# Patient Record
Sex: FEMALE | Race: WHITE | NOT HISPANIC OR LATINO | Employment: FULL TIME | URBAN - METROPOLITAN AREA
[De-identification: names, ages, dates, MRNs, and addresses within clinical notes are randomized per-mention and may not be internally consistent; named-entity substitution may affect disease eponyms.]

---

## 2017-01-03 ENCOUNTER — TELEPHONE (OUTPATIENT)
Dept: ENDOCRINOLOGY | Age: 42
End: 2017-01-03

## 2017-01-03 NOTE — TELEPHONE ENCOUNTER
I spoke with patient today regarding her thyroid u/s letting her know that there were no nodules present at this time and that we would monitor it periodically

## 2017-09-06 RX ORDER — ERGOCALCIFEROL 1.25 MG/1
CAPSULE ORAL
Qty: 12 CAPSULE | Refills: 1 | OUTPATIENT
Start: 2017-09-06

## 2021-09-12 ENCOUNTER — HOSPITAL ENCOUNTER (EMERGENCY)
Facility: HOSPITAL | Age: 46
Discharge: HOME | End: 2021-09-12
Admitting: EMERGENCY MEDICINE

## 2021-09-12 VITALS
BODY MASS INDEX: 27.4 KG/M2 | OXYGEN SATURATION: 99 % | DIASTOLIC BLOOD PRESSURE: 102 MMHG | HEIGHT: 60 IN | RESPIRATION RATE: 20 BRPM | WEIGHT: 139.55 LBS | TEMPERATURE: 98.4 F | HEART RATE: 96 BPM | SYSTOLIC BLOOD PRESSURE: 151 MMHG

## 2021-09-12 DIAGNOSIS — J02.8 PHARYNGITIS DUE TO OTHER ORGANISM: Primary | ICD-10-CM

## 2021-09-12 LAB — S PYO AG THROAT QL: NEGATIVE

## 2021-09-12 PROCEDURE — 99283 EMERGENCY DEPT VISIT LOW MDM: CPT

## 2021-09-12 PROCEDURE — 87081 CULTURE SCREEN ONLY: CPT | Performed by: NURSE PRACTITIONER

## 2021-09-12 PROCEDURE — 87880 STREP A ASSAY W/OPTIC: CPT | Performed by: NURSE PRACTITIONER

## 2021-09-12 RX ORDER — ESOMEPRAZOLE MAGNESIUM 40 MG/1
40 CAPSULE, DELAYED RELEASE ORAL DAILY
COMMUNITY
Start: 2021-08-30 | End: 2021-09-12

## 2021-09-12 RX ORDER — FLUTICASONE PROPIONATE 50 MCG
1 SPRAY, SUSPENSION (ML) NASAL DAILY
COMMUNITY
Start: 2021-09-08 | End: 2021-11-09

## 2021-09-12 RX ORDER — AZITHROMYCIN 250 MG/1
TABLET, FILM COATED ORAL
Qty: 6 TABLET | Refills: 0 | Status: SHIPPED | OUTPATIENT
Start: 2021-09-12 | End: 2021-09-17

## 2021-09-12 RX ORDER — ALPRAZOLAM 0.5 MG/1
.25-.5 TABLET ORAL DAILY PRN
COMMUNITY
Start: 2021-04-19 | End: 2021-11-09

## 2021-09-12 RX ORDER — METFORMIN HYDROCHLORIDE 500 MG/1
1000 TABLET, EXTENDED RELEASE ORAL 2 TIMES DAILY
COMMUNITY
Start: 2021-03-22

## 2021-09-12 RX ORDER — HYDROXYZINE HYDROCHLORIDE 25 MG/1
25 TABLET, FILM COATED ORAL 2 TIMES DAILY
COMMUNITY
Start: 2021-06-16 | End: 2021-11-09

## 2021-09-12 RX ORDER — LISINOPRIL 5 MG/1
5 TABLET ORAL DAILY
COMMUNITY
Start: 2021-03-22 | End: 2021-09-12

## 2021-09-12 RX ORDER — RIZATRIPTAN BENZOATE 10 MG/1
10 TABLET ORAL
COMMUNITY
Start: 2021-06-30

## 2021-09-12 RX ORDER — SEMAGLUTIDE 1.34 MG/ML
INJECTION, SOLUTION SUBCUTANEOUS
COMMUNITY
Start: 2021-08-30

## 2021-09-12 RX ORDER — ZOLPIDEM TARTRATE 10 MG/1
10 TABLET ORAL NIGHTLY PRN
COMMUNITY
Start: 2021-07-16

## 2021-09-12 RX ORDER — CEFDINIR 300 MG/1
300 CAPSULE ORAL
COMMUNITY
Start: 2021-09-08 | End: 2021-09-17

## 2021-09-12 NOTE — ED PROVIDER NOTES
Subjective   Pt is 45 yo WF presenting to ED with complaint of left ear pain x 6 days. She states her face feels swollen. She states she was prescribed Omnicef 5 days ago from her PCP. She denies hearing loss, fevers, chills, cough. She does have some pain with swallowing.          Review of Systems   Constitutional: Negative for chills and fever.   HENT: Positive for ear pain and sore throat. Negative for congestion.    Eyes: Negative for pain.   Respiratory: Negative for cough, chest tightness and shortness of breath.    Cardiovascular: Negative for chest pain.   Gastrointestinal: Negative for abdominal pain, diarrhea, nausea and vomiting.   Genitourinary: Negative for flank pain and hematuria.   Musculoskeletal: Negative for joint swelling.   Skin: Negative for pallor.   Neurological: Negative for seizures and headaches.   All other systems reviewed and are negative.      Past Medical History:   Diagnosis Date   • Hyperlipidemia    • Hypertension    • Type 2 diabetes mellitus (CMS/HCC)    • Vitamin D deficiency        Allergies   Allergen Reactions   • Morphine    • Morphine And Related        History reviewed. No pertinent surgical history.    History reviewed. No pertinent family history.    Social History     Socioeconomic History   • Marital status:      Spouse name: Not on file   • Number of children: Not on file   • Years of education: Not on file   • Highest education level: Not on file   Tobacco Use   • Smoking status: Never Smoker   • Smokeless tobacco: Never Used   Substance and Sexual Activity   • Alcohol use: Defer   • Drug use: Defer   • Sexual activity: Defer           Objective   Physical Exam  Vitals and nursing note reviewed.   Constitutional:       Appearance: Normal appearance.   HENT:      Head: Normocephalic and atraumatic.      Right Ear: Tympanic membrane and ear canal normal.      Left Ear: Tympanic membrane and ear canal normal.      Nose: Nose normal.      Mouth/Throat:     Eyes:       Pupils: Pupils are equal, round, and reactive to light.   Pulmonary:      Effort: Pulmonary effort is normal.   Musculoskeletal:      Cervical back: Normal range of motion.   Skin:     General: Skin is warm and dry.   Neurological:      Mental Status: She is alert.   Psychiatric:         Mood and Affect: Mood normal.         Behavior: Behavior normal.         Procedures           ED Course      1233: Discussed ED findings with pt and plan for discharge. Pt verbalized understanding and agrees with plan.                                      MDM  Number of Diagnoses or Management Options  Pharyngitis due to other organism: established and worsening     Amount and/or Complexity of Data Reviewed  Clinical lab tests: reviewed    Risk of Complications, Morbidity, and/or Mortality  Presenting problems: low  Diagnostic procedures: low  Management options: low    Patient Progress  Patient progress: stable    Labs Reviewed   RAPID STREP A SCREEN - Normal   BETA HEMOLYTIC STREP CULTURE, THROAT       Final diagnoses:   Pharyngitis due to other organism       ED Disposition  ED Disposition     ED Disposition Condition Comment    Discharge Stable           Jesús Holden      As needed    [facility] EAR, NOSE & THROAT  [address]  [address] [id number] [phone]  Call            Medication List      New Prescriptions    azithromycin 250 MG tablet  Commonly known as: Zithromax Z-Eugenio  Take 2 tablets by mouth on day 1, then 1 tablet daily on days 2-5           Where to Get Your Medications      These medications were sent to [facility]/pharmacy #[id number] - [place] - [address] - [phone]  - [phone] FX  [address] [place] [address]    Hours: 24-hours Phone: [phone]   · azithromycin 250 MG tablet          Pavithra Winter APRN  09/12/21 [id number]

## 2021-09-14 LAB — BACTERIA SPEC AEROBE CULT: NORMAL

## 2021-09-16 ENCOUNTER — OFFICE VISIT (OUTPATIENT)
Dept: OTOLARYNGOLOGY | Facility: CLINIC | Age: 46
End: 2021-09-16

## 2021-09-16 VITALS — WEIGHT: 141 LBS | HEIGHT: 60 IN | TEMPERATURE: 97.4 F | BODY MASS INDEX: 27.68 KG/M2

## 2021-09-16 DIAGNOSIS — R07.0 THROAT PAIN: Primary | ICD-10-CM

## 2021-09-16 DIAGNOSIS — J35.8 TONSILLAR MASS: ICD-10-CM

## 2021-09-16 DIAGNOSIS — J35.3 ENLARGED TONSILS AND ADENOIDS: ICD-10-CM

## 2021-09-16 DIAGNOSIS — J03.90 TONSILLITIS: ICD-10-CM

## 2021-09-16 PROCEDURE — 99204 OFFICE O/P NEW MOD 45 MIN: CPT | Performed by: OTOLARYNGOLOGY

## 2021-09-16 RX ORDER — AMOXICILLIN AND CLAVULANATE POTASSIUM 875; 125 MG/1; MG/1
TABLET, FILM COATED ORAL
COMMUNITY
Start: 2021-08-24 | End: 2021-09-17

## 2021-09-16 RX ORDER — GABAPENTIN 800 MG/1
TABLET ORAL
COMMUNITY
Start: 2021-06-22 | End: 2021-09-17

## 2021-09-16 RX ORDER — PROCHLORPERAZINE 25 MG/1
SUPPOSITORY RECTAL
COMMUNITY
Start: 2021-08-09

## 2021-09-16 RX ORDER — VILAZODONE HYDROCHLORIDE 40 MG/1
40 TABLET ORAL DAILY
COMMUNITY
Start: 2021-08-31

## 2021-09-16 RX ORDER — ICOSAPENT ETHYL 1000 MG/1
2 CAPSULE ORAL 2 TIMES DAILY
COMMUNITY
Start: 2021-09-13 | End: 2022-08-27 | Stop reason: SDUPTHER

## 2021-09-16 RX ORDER — AZITHROMYCIN 500 MG/1
TABLET, FILM COATED ORAL
COMMUNITY
Start: 2021-09-12 | End: 2021-09-17

## 2021-09-16 NOTE — H&P (VIEW-ONLY)
Patient Name: Alejandra Delacruz   Visit Date: 09/16/2021   Patient ID: [id number]  Provider: Beau Andres MD    Sex: female  [place] [facility] [facility] [place]   YOB: 1975  Location Address: [address] [place] [address], [address],?[place]?[address]    Primary Care Provider Kieran Holdenua Brayan  Location Phone: [phone]    Referring Provider: KARLY Braden        Chief Complaint  Other (Pharyngitis, ear pain)    Subjective    History of Present Illness  Alejandra Delacruz is a 46 y.o. female who presents to [facility]AT today as a consult from KARLY Braden.    She presents the clinic today for evaluation of 1 month history of bilateral ear pain worse on the left, as well as throat pain.  She has been on multiple courses of antibiotics for this, 4 different antibiotics in the last 6 weeks, without significant improvement.  She finished her last course of antibiotics yesterday.  She continues to have left-sided throat pain.  She has never been a heavy smoker or drinker.  Pain radiates to the left ear.  She has not had any changes to her hearing or ear history.  Her weight has been stable, and she has had no other symptoms.  No previous history of tonsillitis or strep throat infections.    Past Medical History:   Diagnosis Date   • Depression    • Hyperlipidemia    • Hypertension    • Sleep apnea    • Type 2 diabetes mellitus (CMS/HCC)    • Vitamin D deficiency        Past Surgical History:   Procedure Laterality Date   • BLADDER SUSPENSION     • D & C AND LAPAROSCOPY     • EYE SURGERY     • LASER ABLATION     • SHOULDER SURGERY           Current Outpatient Medications:   •  Aspirin Buf,CaCarb-MgCarb-MgO, 81 MG tablet, Take 81 mg by mouth Daily., Disp: , Rfl:   •  atorvastatin (LIPITOR) 40 MG tablet, Take 40 mg by mouth Daily., Disp: , Rfl:   •  Continuous Blood Gluc Sensor (Dexcom G6 Sensor), Apply 1 each topically to the appropriate area as directed.,  Disp: , Rfl:   •  Continuous Blood Gluc Transmit (Dexcom G6 Transmitter) misc, , Disp: , Rfl:   •  empagliflozin (JARDIANCE) 25 MG tablet tablet, Take 25 mg by mouth Daily., Disp: , Rfl:   •  ergocalciferol (DRISDOL) 97419 UNITS capsule, Take 1 po q week, Disp: 12 capsule, Rfl: 1  •  fluticasone (FLONASE) 50 MCG/ACT nasal spray, 1 spray into the nostril(s) as directed by provider Daily., Disp: , Rfl:   •  gabapentin (NEURONTIN) 800 MG tablet, , Disp: , Rfl:   •  glucagon (GLUCAGON EMERGENCY) 1 MG injection, Inject 1 mg under the skin 1 (One) Time As Needed for low blood sugar for up to 1 dose., Disp: 1 kit, Rfl: 0  •  hydrOXYzine (ATARAX) 25 MG tablet, Take 25 mg by mouth., Disp: , Rfl:   •  ibuprofen (ADVIL,MOTRIN) 800 MG tablet, Take 800 mg by mouth Every 8 (Eight) Hours As Needed for mild pain (1-3)., Disp: , Rfl:   •  lisinopril (PRINIVIL,ZESTRIL) 20 MG tablet, Take 20 mg by mouth Daily., Disp: , Rfl:   •  metFORMIN ER (GLUCOPHAGE-XR) 500 MG 24 hr tablet, Take 1,000 mg by mouth., Disp: , Rfl:   •  omeprazole (priLOSEC) 40 MG capsule, Take 40 mg by mouth Daily., Disp: , Rfl:   •  PARoxetine (PAXIL) 40 MG tablet, Take 40 mg by mouth Every Morning., Disp: , Rfl:   •  rizatriptan (MAXALT) 10 MG tablet, Take 10 mg by mouth., Disp: , Rfl:   •  Semaglutide,0.25 or 0.5MG/DOS, (Ozempic, 0.25 or 0.5 MG/DOSE,) 2 MG/1.5ML solution pen-injector, INJECT 0.5MG SUBCUTANEOUSLY EVERY 7 DAYS, Disp: , Rfl:   •  traZODone (DESYREL) 150 MG tablet, Take 150 mg by mouth Every Night., Disp: , Rfl:   •  Vascepa 1 g capsule capsule, , Disp: , Rfl:   •  Viibryd 40 MG tablet tablet, , Disp: , Rfl:   •  zolpidem (AMBIEN) 10 MG tablet, Take 10 mg by mouth., Disp: , Rfl:   •  ALPRAZolam (XANAX) 0.5 MG tablet, Take 0.25-0.5 mg by mouth., Disp: , Rfl:   •  amoxicillin-clavulanate (AUGMENTIN) 875-125 MG per tablet, , Disp: , Rfl:   •  azithromycin (Zithromax Z-Eugenio) 250 MG tablet, Take 2 tablets by mouth on day 1, then 1 tablet daily on days 2-5,  "Disp: 6 tablet, Rfl: 0  •  azithromycin (ZITHROMAX) 500 MG tablet, , Disp: , Rfl:   •  cefdinir (OMNICEF) 300 MG capsule, Take 300 mg by mouth., Disp: , Rfl:   •  cyclobenzaprine (FLEXERIL) 10 MG tablet, Take 10 mg by mouth 3 (Three) Times a Day As Needed for muscle spasms., Disp: , Rfl:   •  Dapagliflozin Propanediol 10 MG tablet, Take 10 mg by mouth Daily., Disp: , Rfl:   •  gabapentin (NEURONTIN) 600 MG tablet, Take 600 mg by mouth 3 (Three) Times a Day., Disp: , Rfl:   •  omega-3 acid ethyl esters (LOVAZA) 1 G capsule, Take 2 g by mouth 2 (Two) Times a Day., Disp: , Rfl:      Allergies   Allergen Reactions   • Morphine And Related Unknown - Low Severity       Family History   Problem Relation Age of Onset   • Diabetes Mother    • Heart disease Mother    • Heart disease Sister    • Diabetes Paternal Aunt    • Cancer Maternal Grandmother    • Heart disease Paternal Grandfather    • Cancer Paternal Grandfather         Social History     Social History Narrative   • Not on file       Objective     Vital Signs:   Temp 97.4 °F (36.3 °C) (Temporal)   Ht 152.4 cm (60\")   Wt 64 kg (141 lb)   BMI 27.54 kg/m²       Physical Exam         Constitutional   Appearance  · : well developed, well-nourished, alert and in no acute distress, voice clear and strong    Head  Inspection  · : no deformities or lesions  Face  Inspection  · : No facial lesions; House-Brackmann I/VI bilaterally  Palpation  · : No TMJ crepitus nor  muscle tenderness bilaterally    Eyes  Vision  Visual Fields  · : Extraocular movements are intact. No spontaneous or gaze-induced nystagmus.  Conjunctivae  · : clear  Sclerae  · : clear  Pupils and Irises  · : pupils equal, round, and reactive to light.     Ears, Nose, Mouth and Throat    Ears    External Ears  · : appearance within normal limits, no lesions present  Otoscopic Examination  · : Tympanic membrane appearance within normal limits bilaterally without perforations, well-aerated middle " ears  Hearing  · : intact to conversational voice both ears  Tunning fork testing:     :    Nose    External Nose  · : appearance normal  Intranasal Exam  · : mucosa within normal limits, vestibules normal, no intranasal lesions present, septum midline, sinuses non tender to percussion  Oral Cavity    Oral Mucosa  · : oral mucosa normal without pallor or cyanosis  Lips  · : lip appearance normal  Teeth  · : normal dentition for age  Gums  · : gums pink, non-swollen, no bleeding present  Tongue  · : tongue appearance normal; normal mobility  Palate  · : hard palate normal, soft palate appearance normal with symmetric mobility    Throat    Oropharynx  · : no inflammation or lesions present, tonsils asymmetric, left tonsil is larger than the right, bilateral chronic infected appearing, left tonsil is slightly hard on palpation, possible tonsil mass  Hypopharynx  · : appearance within normal limits, superior epiglottis within normal limits  Larynx  · : appearance within normal limits, vocal cords within normal limits, no lesions present    Neck  Inspection/Palpation  · : normal appearance, no masses or tenderness, trachea midline; thyroid size normal, nontender, no nodules or masses present on palpation    Respiratory  Respiratory Effort  · : breathing unlabored  Inspection of Chest  · : normal appearance, no retractions    Cardiovascular  Heart  · : regular rate and rhythm    Lymphatic  Neck  · : no lymphadenopathy present  Supraclavicular Nodes  · : no lymphadenopathy present  Preauricular Nodes  · : no lymphadenopathy present    Skin and Subcutaneous Tissue  General Inspection  · : Regarding face and neck - there are no rashes present, no lesions present, and no areas of discoloration    Neurologic  Cranial Nerves  · : cranial nerves II-XII are grossly intact bilaterally  Gait and Station  · : normal gait, able to stand without diffculty    Psychiatric  Judgement and Insight  · : judgment and insight intact  Mood and  Affect  · : mood normal, affect appropriate          Assessment and Plan    Diagnoses and all orders for this visit:    1. Throat pain (Primary)  -     Case Request; Standing  -     Case Request    2. Enlarged tonsils and adenoids  -     Case Request; Standing  -     Case Request    3. Tonsillitis  -     Case Request; Standing  -     Case Request    4. Tonsillar mass  -     Case Request; Standing  -     Case Request    Other orders  -     Follow Anesthesia Guidelines / Protocol; Future  -     Obtain Informed Consent; Future  -     Follow Anesthesia Guidelines / Protocol; Standing  -     Verify NPO Status; Standing  -     Obtain Informed Consent; Standing  -     NPO Diet; Standing    Examination today revealed the tonsils to be symmetric and bilaterally chronically infected appearing.  The left tonsil appears hard on palpation and may have an intratonsillar mass.  I discussed the findings with her and because of the clinical history of having no previous tonsil or strep throat issues and now not improving and multiple course of antibiotics, I would recommend tonsillectomy and adenoidectomy, as well as a direct laryngoscopy and bronchoscopy to rule out malignancy.  I discussed the procedure with her at length, including possible complications and alternatives.  She states that she understands, would like to proceed.  I will make arrangements to have the scheduled for her soon as possible.    Follow Up   No follow-ups on file.  Patient was given instructions and counseling regarding her condition or for health maintenance advice. Please see specific information pulled into the AVS if appropriate.

## 2021-09-16 NOTE — PROGRESS NOTES
Patient Name: Alejandra Delacruz   Visit Date: 09/16/2021   Patient ID: [id number]  Provider: Beau Andres MD    Sex: female  [place]: [facility]   YOB: 1975  Location Address: [address] [place] [address], [address],?[place]    Primary Care Provider Kieran Holdenua Brayan  Location Phone: [phone]    Referring Provider: KARLY Braden        Chief Complaint  Other (Pharyngitis, ear pain)    Subjective    History of Present Illness  Alejandra Delacruz is a 46 y.o. female who presents to [facility] E[facility], NOSE [facility] THROAT today as a consult from KARLY Braden.    She presents the clinic today for evaluation of 1 month history of bilateral ear pain worse on the left, as well as throat pain.  She has been on multiple courses of antibiotics for this, 4 different antibiotics in the last 6 weeks, without significant improvement.  She finished her last course of antibiotics yesterday.  She continues to have left-sided throat pain.  She has never been a heavy smoker or drinker.  Pain radiates to the left ear.  She has not had any changes to her hearing or ear history.  Her weight has been stable, and she has had no other symptoms.  No previous history of tonsillitis or strep throat infections.    Past Medical History:   Diagnosis Date   • Depression    • Hyperlipidemia    • Hypertension    • Sleep apnea    • Type 2 diabetes mellitus (CMS/HCC)    • Vitamin D deficiency        Past Surgical History:   Procedure Laterality Date   • BLADDER SUSPENSION     • D & C AND LAPAROSCOPY     • EYE SURGERY     • LASER ABLATION     • SHOULDER SURGERY           Current Outpatient Medications:   •  Aspirin Buf,CaCarb-MgCarb-MgO, 81 MG tablet, Take 81 mg by mouth Daily., Disp: , Rfl:   •  atorvastatin (LIPITOR) 40 MG tablet, Take 40 mg by mouth Daily., Disp: , Rfl:   •  Continuous Blood Gluc Sensor (Dexcom G6 Sensor), Apply 1 each topically to the appropriate area as directed.,  Disp: , Rfl:   •  Continuous Blood Gluc Transmit (Dexcom G6 Transmitter) misc, , Disp: , Rfl:   •  empagliflozin (JARDIANCE) 25 MG tablet tablet, Take 25 mg by mouth Daily., Disp: , Rfl:   •  ergocalciferol (DRISDOL) 68124 UNITS capsule, Take 1 po q week, Disp: 12 capsule, Rfl: 1  •  fluticasone (FLONASE) 50 MCG/ACT nasal spray, 1 spray into the nostril(s) as directed by provider Daily., Disp: , Rfl:   •  gabapentin (NEURONTIN) 800 MG tablet, , Disp: , Rfl:   •  glucagon (GLUCAGON EMERGENCY) 1 MG injection, Inject 1 mg under the skin 1 (One) Time As Needed for low blood sugar for up to 1 dose., Disp: 1 kit, Rfl: 0  •  hydrOXYzine (ATARAX) 25 MG tablet, Take 25 mg by mouth., Disp: , Rfl:   •  ibuprofen (ADVIL,MOTRIN) 800 MG tablet, Take 800 mg by mouth Every 8 (Eight) Hours As Needed for mild pain (1-3)., Disp: , Rfl:   •  lisinopril (PRINIVIL,ZESTRIL) 20 MG tablet, Take 20 mg by mouth Daily., Disp: , Rfl:   •  metFORMIN ER (GLUCOPHAGE-XR) 500 MG 24 hr tablet, Take 1,000 mg by mouth., Disp: , Rfl:   •  omeprazole (priLOSEC) 40 MG capsule, Take 40 mg by mouth Daily., Disp: , Rfl:   •  PARoxetine (PAXIL) 40 MG tablet, Take 40 mg by mouth Every Morning., Disp: , Rfl:   •  rizatriptan (MAXALT) 10 MG tablet, Take 10 mg by mouth., Disp: , Rfl:   •  Semaglutide,0.25 or 0.5MG/DOS, (Ozempic, 0.25 or 0.5 MG/DOSE,) 2 MG/1.5ML solution pen-injector, INJECT 0.5MG SUBCUTANEOUSLY EVERY 7 DAYS, Disp: , Rfl:   •  traZODone (DESYREL) 150 MG tablet, Take 150 mg by mouth Every Night., Disp: , Rfl:   •  Vascepa 1 g capsule capsule, , Disp: , Rfl:   •  Viibryd 40 MG tablet tablet, , Disp: , Rfl:   •  zolpidem (AMBIEN) 10 MG tablet, Take 10 mg by mouth., Disp: , Rfl:   •  ALPRAZolam (XANAX) 0.5 MG tablet, Take 0.25-0.5 mg by mouth., Disp: , Rfl:   •  amoxicillin-clavulanate (AUGMENTIN) 875-125 MG per tablet, , Disp: , Rfl:   •  azithromycin (Zithromax Z-Eugenio) 250 MG tablet, Take 2 tablets by mouth on day 1, then 1 tablet daily on days 2-5,  "Disp: 6 tablet, Rfl: 0  •  azithromycin (ZITHROMAX) 500 MG tablet, , Disp: , Rfl:   •  cefdinir (OMNICEF) 300 MG capsule, Take 300 mg by mouth., Disp: , Rfl:   •  cyclobenzaprine (FLEXERIL) 10 MG tablet, Take 10 mg by mouth 3 (Three) Times a Day As Needed for muscle spasms., Disp: , Rfl:   •  Dapagliflozin Propanediol 10 MG tablet, Take 10 mg by mouth Daily., Disp: , Rfl:   •  gabapentin (NEURONTIN) 600 MG tablet, Take 600 mg by mouth 3 (Three) Times a Day., Disp: , Rfl:   •  omega-3 acid ethyl esters (LOVAZA) 1 G capsule, Take 2 g by mouth 2 (Two) Times a Day., Disp: , Rfl:      Allergies   Allergen Reactions   • Morphine And Related Unknown - Low Severity       Family History   Problem Relation Age of Onset   • Diabetes Mother    • Heart disease Mother    • Heart disease Sister    • Diabetes Paternal Aunt    • Cancer Maternal Grandmother    • Heart disease Paternal Grandfather    • Cancer Paternal Grandfather         Social History     Social History Narrative   • Not on file       Objective     Vital Signs:   Temp 97.4 °F (36.3 °C) (Temporal)   Ht 152.4 cm (60\")   Wt 64 kg (141 lb)   BMI 27.54 kg/m²       Physical Exam         Constitutional   Appearance  · : well developed, well-nourished, alert and in no acute distress, voice clear and strong    Head  Inspection  · : no deformities or lesions  Face  Inspection  · : No facial lesions; House-Brackmann I/VI bilaterally  Palpation  · : No TMJ crepitus nor  muscle tenderness bilaterally    Eyes  Vision  Visual Fields  · : Extraocular movements are intact. No spontaneous or gaze-induced nystagmus.  Conjunctivae  · : clear  Sclerae  · : clear  Pupils and Irises  · : pupils equal, round, and reactive to light.     Ears, Nose, Mouth and Throat    Ears    External Ears  · : appearance within normal limits, no lesions present  Otoscopic Examination  · : Tympanic membrane appearance within normal limits bilaterally without perforations, well-aerated middle " ears  Hearing  · : intact to conversational voice both ears  Tunning fork testing:     :    Nose    External Nose  · : appearance normal  Intranasal Exam  · : mucosa within normal limits, vestibules normal, no intranasal lesions present, septum midline, sinuses non tender to percussion  Oral Cavity    Oral Mucosa  · : oral mucosa normal without pallor or cyanosis  Lips  · : lip appearance normal  Teeth  · : normal dentition for age  Gums  · : gums pink, non-swollen, no bleeding present  Tongue  · : tongue appearance normal; normal mobility  Palate  · : hard palate normal, soft palate appearance normal with symmetric mobility    Throat    Oropharynx  · : no inflammation or lesions present, tonsils asymmetric, left tonsil is larger than the right, bilateral chronic infected appearing, left tonsil is slightly hard on palpation, possible tonsil mass  Hypopharynx  · : appearance within normal limits, superior epiglottis within normal limits  Larynx  · : appearance within normal limits, vocal cords within normal limits, no lesions present    Neck  Inspection/Palpation  · : normal appearance, no masses or tenderness, trachea midline; thyroid size normal, nontender, no nodules or masses present on palpation    Respiratory  Respiratory Effort  · : breathing unlabored  Inspection of Chest  · : normal appearance, no retractions    Cardiovascular  Heart  · : regular rate and rhythm    Lymphatic  Neck  · : no lymphadenopathy present  Supraclavicular Nodes  · : no lymphadenopathy present  Preauricular Nodes  · : no lymphadenopathy present    Skin and Subcutaneous Tissue  General Inspection  · : Regarding face and neck - there are no rashes present, no lesions present, and no areas of discoloration    Neurologic  Cranial Nerves  · : cranial nerves II-XII are grossly intact bilaterally  Gait and Station  · : normal gait, able to stand without diffculty    Psychiatric  Judgement and Insight  · : judgment and insight intact  Mood and  Affect  · : mood normal, affect appropriate          Assessment and Plan    Diagnoses and all orders for this visit:    1. Throat pain (Primary)  -     Case Request; Standing  -     Case Request    2. Enlarged tonsils and adenoids  -     Case Request; Standing  -     Case Request    3. Tonsillitis  -     Case Request; Standing  -     Case Request    4. Tonsillar mass  -     Case Request; Standing  -     Case Request    Other orders  -     Follow Anesthesia Guidelines / Protocol; Future  -     Obtain Informed Consent; Future  -     Follow Anesthesia Guidelines / Protocol; Standing  -     Verify NPO Status; Standing  -     Obtain Informed Consent; Standing  -     NPO Diet; Standing    Examination today revealed the tonsils to be symmetric and bilaterally chronically infected appearing.  The left tonsil appears hard on palpation and may have an intratonsillar mass.  I discussed the findings with her and because of the clinical history of having no previous tonsil or strep throat issues and now not improving and multiple course of antibiotics, I would recommend tonsillectomy and adenoidectomy, as well as a direct laryngoscopy and bronchoscopy to rule out malignancy.  I discussed the procedure with her at length, including possible complications and alternatives.  She states that she understands, would like to proceed.  I will make arrangements to have the scheduled for her soon as possible.    Follow Up   No follow-ups on file.  Patient was given instructions and counseling regarding her condition or for health maintenance advice. Please see specific information pulled into the AVS if appropriate.

## 2021-09-17 NOTE — PRE-PROCEDURE INSTRUCTIONS
Patient instructed to have no food past midnight, clears up to 2 hours prior to arrival time.  Patient instructed to take gabapentin, viibryd, hydroxyzine, omeprazole, and prn meds if needed. Instructed to stop NSAIDS. Patient verbalized understanding.

## 2021-09-20 ENCOUNTER — HOSPITAL ENCOUNTER (OUTPATIENT)
Facility: HOSPITAL | Age: 46
Setting detail: HOSPITAL OUTPATIENT SURGERY
Discharge: HOME | End: 2021-09-20
Attending: OTOLARYNGOLOGY | Admitting: OTOLARYNGOLOGY

## 2021-09-20 ENCOUNTER — ANESTHESIA (OUTPATIENT)
Dept: PERIOP | Facility: HOSPITAL | Age: 46
End: 2021-09-20

## 2021-09-20 ENCOUNTER — ANESTHESIA EVENT (OUTPATIENT)
Dept: PERIOP | Facility: HOSPITAL | Age: 46
End: 2021-09-20

## 2021-09-20 VITALS
TEMPERATURE: 98 F | OXYGEN SATURATION: 95 % | BODY MASS INDEX: 26.97 KG/M2 | HEIGHT: 60 IN | WEIGHT: 137.35 LBS | RESPIRATION RATE: 19 BRPM | SYSTOLIC BLOOD PRESSURE: 131 MMHG | HEART RATE: 89 BPM | DIASTOLIC BLOOD PRESSURE: 71 MMHG

## 2021-09-20 DIAGNOSIS — J03.90 TONSILLITIS: ICD-10-CM

## 2021-09-20 DIAGNOSIS — J35.3 ENLARGED TONSILS AND ADENOIDS: ICD-10-CM

## 2021-09-20 DIAGNOSIS — J35.8 TONSILLAR MASS: ICD-10-CM

## 2021-09-20 DIAGNOSIS — R07.0 THROAT PAIN: ICD-10-CM

## 2021-09-20 LAB
B-HCG UR QL: NEGATIVE
GLUCOSE BLDC GLUCOMTR-MCNC: 105 MG/DL (ref 70–99)
GLUCOSE BLDC GLUCOMTR-MCNC: 135 MG/DL (ref 70–99)

## 2021-09-20 PROCEDURE — 93005 ELECTROCARDIOGRAM TRACING: CPT | Performed by: ANESTHESIOLOGY

## 2021-09-20 PROCEDURE — 82962 GLUCOSE BLOOD TEST: CPT

## 2021-09-20 PROCEDURE — 25010000002 PROCHLORPERAZINE 10 MG/2ML SOLUTION: Performed by: ANESTHESIOLOGY

## 2021-09-20 PROCEDURE — 25010000002 MIDAZOLAM PER 1MG: Performed by: ANESTHESIOLOGY

## 2021-09-20 PROCEDURE — 25010000002 NEOSTIGMINE 10 MG/10ML SOLUTION: Performed by: ANESTHESIOLOGY

## 2021-09-20 PROCEDURE — 25010000002 ONDANSETRON PER 1 MG: Performed by: ANESTHESIOLOGY

## 2021-09-20 PROCEDURE — 25010000002 HYDROMORPHONE PER 4 MG: Performed by: ANESTHESIOLOGY

## 2021-09-20 PROCEDURE — 88304 TISSUE EXAM BY PATHOLOGIST: CPT | Performed by: OTOLARYNGOLOGY

## 2021-09-20 PROCEDURE — 25010000002 DEXAMETHASONE PER 1 MG: Performed by: ANESTHESIOLOGY

## 2021-09-20 PROCEDURE — 93010 ELECTROCARDIOGRAM REPORT: CPT | Performed by: INTERNAL MEDICINE

## 2021-09-20 PROCEDURE — 25010000002 PROPOFOL 10 MG/ML EMULSION: Performed by: ANESTHESIOLOGY

## 2021-09-20 PROCEDURE — 25010000002 FENTANYL CITRATE (PF) 50 MCG/ML SOLUTION: Performed by: ANESTHESIOLOGY

## 2021-09-20 PROCEDURE — 81025 URINE PREGNANCY TEST: CPT | Performed by: ANESTHESIOLOGY

## 2021-09-20 RX ORDER — ONDANSETRON 2 MG/ML
4 INJECTION INTRAMUSCULAR; INTRAVENOUS ONCE AS NEEDED
Status: DISCONTINUED | OUTPATIENT
Start: 2021-09-20 | End: 2021-09-20 | Stop reason: HOSPADM

## 2021-09-20 RX ORDER — OXYCODONE HYDROCHLORIDE 5 MG/1
5 TABLET ORAL
Status: DISCONTINUED | OUTPATIENT
Start: 2021-09-20 | End: 2021-09-20 | Stop reason: HOSPADM

## 2021-09-20 RX ORDER — ACETAMINOPHEN 500 MG
1000 TABLET ORAL ONCE
Status: COMPLETED | OUTPATIENT
Start: 2021-09-20 | End: 2021-09-20

## 2021-09-20 RX ORDER — SODIUM CHLORIDE, SODIUM LACTATE, POTASSIUM CHLORIDE, CALCIUM CHLORIDE 600; 310; 30; 20 MG/100ML; MG/100ML; MG/100ML; MG/100ML
9 INJECTION, SOLUTION INTRAVENOUS CONTINUOUS PRN
Status: DISCONTINUED | OUTPATIENT
Start: 2021-09-20 | End: 2021-09-20 | Stop reason: HOSPADM

## 2021-09-20 RX ORDER — HYDROCODONE BITARTRATE AND ACETAMINOPHEN 5; 325 MG/1; MG/1
1-2 TABLET ORAL EVERY 4 HOURS PRN
Qty: 30 TABLET | Refills: 0 | Status: SHIPPED | OUTPATIENT
Start: 2021-09-20 | End: 2021-11-09

## 2021-09-20 RX ORDER — PROMETHAZINE HYDROCHLORIDE 25 MG/1
25 SUPPOSITORY RECTAL ONCE AS NEEDED
Status: DISCONTINUED | OUTPATIENT
Start: 2021-09-20 | End: 2021-09-20 | Stop reason: HOSPADM

## 2021-09-20 RX ORDER — NEOSTIGMINE METHYLSULFATE 1 MG/ML
INJECTION, SOLUTION INTRAVENOUS AS NEEDED
Status: DISCONTINUED | OUTPATIENT
Start: 2021-09-20 | End: 2021-09-20 | Stop reason: SURG

## 2021-09-20 RX ORDER — ONDANSETRON 2 MG/ML
INJECTION INTRAMUSCULAR; INTRAVENOUS
Status: DISCONTINUED
Start: 2021-09-20 | End: 2021-09-20 | Stop reason: HOSPADM

## 2021-09-20 RX ORDER — MIDAZOLAM HYDROCHLORIDE 2 MG/2ML
2 INJECTION, SOLUTION INTRAMUSCULAR; INTRAVENOUS ONCE
Status: COMPLETED | OUTPATIENT
Start: 2021-09-20 | End: 2021-09-20

## 2021-09-20 RX ORDER — MAGNESIUM HYDROXIDE 1200 MG/15ML
LIQUID ORAL AS NEEDED
Status: DISCONTINUED | OUTPATIENT
Start: 2021-09-20 | End: 2021-09-20 | Stop reason: HOSPADM

## 2021-09-20 RX ORDER — DEXAMETHASONE SODIUM PHOSPHATE 4 MG/ML
INJECTION, SOLUTION INTRA-ARTICULAR; INTRALESIONAL; INTRAMUSCULAR; INTRAVENOUS; SOFT TISSUE AS NEEDED
Status: DISCONTINUED | OUTPATIENT
Start: 2021-09-20 | End: 2021-09-20 | Stop reason: SURG

## 2021-09-20 RX ORDER — PROPOFOL 10 MG/ML
VIAL (ML) INTRAVENOUS AS NEEDED
Status: DISCONTINUED | OUTPATIENT
Start: 2021-09-20 | End: 2021-09-20 | Stop reason: SURG

## 2021-09-20 RX ORDER — MEPERIDINE HYDROCHLORIDE 25 MG/ML
12.5 INJECTION INTRAMUSCULAR; INTRAVENOUS; SUBCUTANEOUS
Status: DISCONTINUED | OUTPATIENT
Start: 2021-09-20 | End: 2021-09-20 | Stop reason: HOSPADM

## 2021-09-20 RX ORDER — HYDROMORPHONE HCL 110MG/55ML
PATIENT CONTROLLED ANALGESIA SYRINGE INTRAVENOUS AS NEEDED
Status: DISCONTINUED | OUTPATIENT
Start: 2021-09-20 | End: 2021-09-20 | Stop reason: SURG

## 2021-09-20 RX ORDER — GLYCOPYRROLATE 0.2 MG/ML
0.2 INJECTION INTRAMUSCULAR; INTRAVENOUS
Status: COMPLETED | OUTPATIENT
Start: 2021-09-20 | End: 2021-09-20

## 2021-09-20 RX ORDER — PROMETHAZINE HYDROCHLORIDE 12.5 MG/1
25 TABLET ORAL ONCE AS NEEDED
Status: DISCONTINUED | OUTPATIENT
Start: 2021-09-20 | End: 2021-09-20 | Stop reason: HOSPADM

## 2021-09-20 RX ORDER — GLYCOPYRROLATE 0.2 MG/ML
INJECTION INTRAMUSCULAR; INTRAVENOUS AS NEEDED
Status: DISCONTINUED | OUTPATIENT
Start: 2021-09-20 | End: 2021-09-20 | Stop reason: SURG

## 2021-09-20 RX ORDER — PROCHLORPERAZINE EDISYLATE 5 MG/ML
5 INJECTION INTRAMUSCULAR; INTRAVENOUS ONCE AS NEEDED
Status: COMPLETED | OUTPATIENT
Start: 2021-09-20 | End: 2021-09-20

## 2021-09-20 RX ORDER — FENTANYL CITRATE 50 UG/ML
INJECTION, SOLUTION INTRAMUSCULAR; INTRAVENOUS AS NEEDED
Status: DISCONTINUED | OUTPATIENT
Start: 2021-09-20 | End: 2021-09-20 | Stop reason: SURG

## 2021-09-20 RX ORDER — ONDANSETRON 2 MG/ML
INJECTION INTRAMUSCULAR; INTRAVENOUS AS NEEDED
Status: DISCONTINUED | OUTPATIENT
Start: 2021-09-20 | End: 2021-09-20 | Stop reason: SURG

## 2021-09-20 RX ORDER — PROCHLORPERAZINE EDISYLATE 5 MG/ML
5 INJECTION INTRAMUSCULAR; INTRAVENOUS ONCE
Status: DISCONTINUED | OUTPATIENT
Start: 2021-09-20 | End: 2021-09-20

## 2021-09-20 RX ORDER — ROCURONIUM BROMIDE 10 MG/ML
INJECTION, SOLUTION INTRAVENOUS AS NEEDED
Status: DISCONTINUED | OUTPATIENT
Start: 2021-09-20 | End: 2021-09-20 | Stop reason: SURG

## 2021-09-20 RX ORDER — PHENYLEPHRINE HCL IN 0.9% NACL 1 MG/10 ML
SYRINGE (ML) INTRAVENOUS AS NEEDED
Status: DISCONTINUED | OUTPATIENT
Start: 2021-09-20 | End: 2021-09-20 | Stop reason: SURG

## 2021-09-20 RX ADMIN — ACETAMINOPHEN 1000 MG: 500 TABLET ORAL at 09:00

## 2021-09-20 RX ADMIN — HYDROMORPHONE HYDROCHLORIDE 1 MG: 2 INJECTION, SOLUTION INTRAMUSCULAR; INTRAVENOUS; SUBCUTANEOUS at 10:40

## 2021-09-20 RX ADMIN — GLYCOPYRROLATE 0.2 MG: 0.2 INJECTION INTRAMUSCULAR; INTRAVENOUS at 09:41

## 2021-09-20 RX ADMIN — ONDANSETRON 4 MG: 2 INJECTION INTRAMUSCULAR; INTRAVENOUS at 10:25

## 2021-09-20 RX ADMIN — HYDROMORPHONE HYDROCHLORIDE 1 MG: 2 INJECTION, SOLUTION INTRAMUSCULAR; INTRAVENOUS; SUBCUTANEOUS at 11:05

## 2021-09-20 RX ADMIN — GLYCOPYRROLATE 0.4 MCG: 0.2 INJECTION INTRAMUSCULAR; INTRAVENOUS at 11:10

## 2021-09-20 RX ADMIN — Medication 200 MCG: at 10:40

## 2021-09-20 RX ADMIN — ROCURONIUM BROMIDE 40 MG: 10 INJECTION INTRAVENOUS at 10:21

## 2021-09-20 RX ADMIN — MIDAZOLAM HYDROCHLORIDE 2 MG: 1 INJECTION, SOLUTION INTRAMUSCULAR; INTRAVENOUS at 09:41

## 2021-09-20 RX ADMIN — PROPOFOL 200 MG: 10 INJECTION, EMULSION INTRAVENOUS at 10:20

## 2021-09-20 RX ADMIN — FENTANYL CITRATE 100 MCG: 50 INJECTION INTRAMUSCULAR; INTRAVENOUS at 10:22

## 2021-09-20 RX ADMIN — ONDANSETRON 4 MG: 2 INJECTION INTRAMUSCULAR; INTRAVENOUS at 11:32

## 2021-09-20 RX ADMIN — Medication 200 MCG: at 10:22

## 2021-09-20 RX ADMIN — DEXAMETHASONE SODIUM PHOSPHATE 4 MG: 4 INJECTION INTRA-ARTICULAR; INTRALESIONAL; INTRAMUSCULAR; INTRAVENOUS; SOFT TISSUE at 10:25

## 2021-09-20 RX ADMIN — NEOSTIGMINE METHYLSULFATE 4 MG: 1 INJECTION, SOLUTION INTRAVENOUS at 11:10

## 2021-09-20 RX ADMIN — DEXAMETHASONE SODIUM PHOSPHATE 4 MG: 4 INJECTION INTRA-ARTICULAR; INTRALESIONAL; INTRAMUSCULAR; INTRAVENOUS; SOFT TISSUE at 10:47

## 2021-09-20 RX ADMIN — SODIUM CHLORIDE, POTASSIUM CHLORIDE, SODIUM LACTATE AND CALCIUM CHLORIDE 9 ML/HR: 600; 310; 30; 20 INJECTION, SOLUTION INTRAVENOUS at 09:41

## 2021-09-20 RX ADMIN — PROCHLORPERAZINE EDISYLATE 5 MG: 5 INJECTION INTRAMUSCULAR; INTRAVENOUS at 12:23

## 2021-09-20 NOTE — ANESTHESIA POSTPROCEDURE EVALUATION
Patient: Alejandra Delacruz    Procedure Summary     Date: 09/20/21 Room / Location: [facility] OR 05 / [facility] MAIN OR    Anesthesia Start: 1015 Anesthesia Stop: 1117    Procedure: TONSILLECTOMY AND ADENOIDECTOMY, DIRECT LARYNGOSCOPY,BRONCHOSCOPY,ESOPHAGOSCOPY (N/A Throat) Diagnosis:       Throat pain      Enlarged tonsils and adenoids      Tonsillitis      Tonsillar mass      (Throat pain [R07.0])      (Enlarged tonsils and adenoids [J35.3])      (Tonsillitis [J03.90])      (Tonsillar mass [J35.8])    Surgeons: Beau Andres MD Provider: Silver Price MD    Anesthesia Type: general ASA Status: 3          Anesthesia Type: general    Vitals  Vitals Value Taken Time   /66 09/20/21 1144   Temp 36.2 °C (97.1 °F) 09/20/21 1121   Pulse 71 09/20/21 1147   Resp 18 09/20/21 1136   SpO2 96 % 09/20/21 1147   Vitals shown include unvalidated device data.        Post Anesthesia Care and Evaluation    Patient location during evaluation: bedside  Patient participation: complete - patient participated  Level of consciousness: awake  Pain score: 0  Pain management: adequate  Airway patency: patent  Anesthetic complications: No anesthetic complications  PONV Status: none  Cardiovascular status: acceptable and stable  Respiratory status: acceptable and room air  Hydration status: acceptable    Comments: An Anesthesiologist personally participated in the most demanding procedures (including induction and emergence if applicable) in the anesthesia plan, monitored the course of anesthesia administration at frequent intervals and remained physically present and available for immediate diagnosis and treatment of emergencies.

## 2021-09-20 NOTE — BRIEF OP NOTE
TONSILLECTOMY AND ADENOIDECTOMY  Progress Note    Alejandra Delacruz  9/20/2021    Pre-op Diagnosis:   Throat pain [R07.0]  Enlarged tonsils and adenoids [J35.3]  Tonsillitis [J03.90]  Tonsillar mass [J35.8]       Post-Op Diagnosis Codes:     * Throat pain [R07.0]     * Enlarged tonsils and adenoids [J35.3]     * Tonsillitis [J03.90]     * Tonsillar mass [J35.8]    Procedure/CPT® Codes:  96130  36829  15852  13231     Procedure(s):  TONSILLECTOMY AND ADENOIDECTOMY, DIRECT LARYNGOSCOPY,BRONCHOSCOPY,ESOPHAGOSCOPY     Surgeon(s):  Beau Andres MD     Anesthesia: General     Staff:   Circulator: Tanya Myers RN  Scrub Person: Isai Chapin  Other: Marcela Marquez RN CSA     Estimated Blood Loss: 5 mL     Urine Voided: * No values recorded between 9/20/2021 10:14 AM and 9/20/2021 11:17 AM *     Specimens:                           Specimens      ID Source Type Tests Collected By Collected At Frozen?     A Tonsil, Left Tissue TISSUE PATHOLOGY EXAM    Beau Andres MD 9/20/21 1043 No     Description: LEFT TONSIL     B Tonsil, Right Tissue TISSUE PATHOLOGY EXAM    Beau Andres MD 9/20/21 1043 No     Description: RIGHT TONSIL             Findings:                           1. Normal Laryngoscopy without lesions or abnormality                          2. Normal Bronchoscopy                           3. Left tonsil indurated, hard on palpation, surrounding soft tissue edema and scar tissue                          4. Right tonsil normal                          5. Adenoid mildly enlarged, chronic infected appearance     Complications: None     The patient was brought into the operating room and placed in the supine position on the operating room table. Mask inhalational anesthesia was induced, and the patient was intubated orotracheally without difficulty. Next, a timeout was performed to identify the correct patient and procedure. The head of the bed was then turned 90°. The [facility]  laryngoscope was introduced into the oral cavity, and passed down to the level of the larynx. The epiglottis appeared completely normal as did the base of tongue.  The vocal folds were clean and free of any lesions. Photodocumentation of the findings was obtained.  The left tonsil appeared more prominent, was hard on palpation, and had significant edema of surrounding tissues. Photos were obtained.     Next, the bronchoscope was passed through the vocal folds into the subglottic area.  The subglottic area was normal without any lesions or masses, and was not narrowed.  The endotracheal tube was taken down and the trachea was assessed.  The tracheal rings were intact, and there was no evidence of obstruction or stricture.  The blanca was normal.  There were no abnormalities noted on bronchoscopy.  Photos of this were taken.     Next, the bronchoscope and laryngoscope were withdrawn.  The cervical esophagoscope was introduced into the esophageal introitus, and passed easily down the level of the cervical esophagus.  Suction was used to get rid of secretions, and the mucosa was thoroughly assess.  The [employer] scopes were used to assess the mucosa of the scope was withdrawn, and pictures were taken.  There were no abnormal findings noted on esophagoscopy.      The Ray-Dandre mouth retractor is introduced into the oral cavity, and was suspended on a Bowling stand. There was no evidence of a submucosal cleft or bifid uvula. The tonsils were 1+ on the right and 2+ on the left, hypertrophic, and chronically infected-appearing. The tonsil tenaculum was used to grasp the right tonsil, and the Bovie cautery was used to dissect out the tonsil from the superior anterior pole down to the posterior inferior pole at the level of the capsule. The same procedure was then performed on the left side. Left tonsil was more indurated and had surrounding scar tissue between tonsil and surrounding musculature. Peritonsillar plane was less  well defined. Hemostasis was achieved with the Bipolar cautery.      Attention was then turned to the adenoid. The red rubber catheters placed through the right nasal passage and the soft palate was elevated anteriorly. A mirror was used to visualize the adenoid pad which was mildly enlarged, and chronically infected-appearing. The suction Bovie was used to take down the adenoid pad and achieve hemostasis within the nasopharynx. Saline was used to irrigate the nasopharynx, and hemostasis was confirmed. This concluded the case, the patient's care was handed back to anesthesia in good condition without any complications.        Beau Andres MD     Date: 9/20/2021  Time: 11:51 EDT

## 2021-09-20 NOTE — ANESTHESIA PREPROCEDURE EVALUATION
Anesthesia Evaluation     Patient summary reviewed and Nursing notes reviewed   no history of anesthetic complications:  NPO Solid Status: > 8 hours  NPO Liquid Status: > 2 hours           Airway   Mallampati: II  TM distance: >3 FB  Neck ROM: full  No difficulty expected  Dental - normal exam     Pulmonary - normal exam   (+) sleep apnea on CPAP,   Cardiovascular - normal exam  Exercise tolerance: good (4-7 METS)    (+) hypertension, hyperlipidemia,       Neuro/Psych  (+) psychiatric history PTSD and Depression,     GI/Hepatic/Renal/Endo    (+)   diabetes mellitus type 2,     Musculoskeletal     (+) neck pain (throat pain),   Abdominal  - normal exam    Bowel sounds: normal.   Substance History - negative use     OB/GYN negative ob/gyn ROS         Other - negative ROS       ROS/Med Hx Other: Morphine itching, Dilaudid was ok                Anesthesia Plan    ASA 3     general     intravenous induction     Anesthetic plan, all risks, benefits, and alternatives have been provided, discussed and informed consent has been obtained with: patient.

## 2021-09-21 LAB
CYTO UR: NORMAL
LAB AP CASE REPORT: NORMAL
LAB AP CLINICAL INFORMATION: NORMAL
PATH REPORT.FINAL DX SPEC: NORMAL
PATH REPORT.GROSS SPEC: NORMAL
QT INTERVAL: 375 MS

## 2021-09-23 NOTE — BRIEF OP NOTE
TONSILLECTOMY AND ADENOIDECTOMY  Progress Note    Alejandra Delacruz  9/20/2021    Pre-op Diagnosis:   Throat pain [R07.0]  Enlarged tonsils and adenoids [J35.3]  Tonsillitis [J03.90]  Tonsillar mass [J35.8]       Post-Op Diagnosis Codes:     * Throat pain [R07.0]     * Enlarged tonsils and adenoids [J35.3]     * Tonsillitis [J03.90]     * Tonsillar mass [J35.8]    Procedure/CPT® Codes:    22758  29946  27320  23464    Procedure(s):  TONSILLECTOMY AND ADENOIDECTOMY, DIRECT LARYNGOSCOPY,BRONCHOSCOPY,ESOPHAGOSCOPY    Surgeon(s):  Beau Andres MD    Anesthesia: General    Staff:   Circulator: Tanya Myers RN  Scrub Person: Isai Chapin  Other: Marcela Marquez RN CSA     Estimated Blood Loss: 5 mL    Urine Voided: * No values recorded between 9/20/2021 10:14 AM and 9/20/2021 11:17 AM *    Specimens:                Specimens     ID Source Type Tests Collected By Collected At Frozen?    A Tonsil, Left Tissue · TISSUE PATHOLOGY EXAM   Beau Andres MD 9/20/21 1043 No    Description: LEFT TONSIL    B Tonsil, Right Tissue · TISSUE PATHOLOGY EXAM   Beau Andres MD 9/20/21 1043 No    Description: RIGHT TONSIL          Findings:     1. Normal Laryngoscopy without lesions or abnormality    2. Normal Bronchoscopy     3. Left tonsil indurated, hard on palpation, surrounding soft tissue edema and scar tissue    4. Right tonsil normal    5. Adenoid mildly enlarged, chronic infected appearance    Complications: None    The patient was brought into the operating room and placed in the supine position on the operating room table. Mask inhalational anesthesia was induced, and the patient was intubated orotracheally without difficulty. Next, a timeout was performed to identify the correct patient and procedure. The head of the bed was then turned 90°. The [employer] laryngoscope was introduced into the oral cavity, and passed down to the level of the larynx. The epiglottis appeared completely normal as did  the base of tongue.  The vocal folds were clean and free of any lesions. Photodocumentation of the findings was obtained.  The left tonsil appeared more prominent, was hard on palpation, and had significant edema of surrounding tissues. Photos were obtained.    Next, the bronchoscope was passed through the vocal folds into the subglottic area.  The subglottic area was normal without any lesions or masses, and was not narrowed.  The endotracheal tube was taken down and the trachea was assessed.  The tracheal rings were intact, and there was no evidence of obstruction or stricture.  The blanca was normal.  There were no abnormalities noted on bronchoscopy.  Photos of this were taken.    Next, the bronchoscope and laryngoscope were withdrawn.  The cervical esophagoscope was introduced into the esophageal introitus, and passed easily down the level of the cervical esophagus.  Suction was used to get rid of secretions, and the mucosa was thoroughly assess.  The [employer] scopes were used to assess the mucosa of the scope was withdrawn, and pictures were taken.  There were no abnormal findings noted on esophagoscopy.     The Ray-Dandre mouth retractor is introduced into the oral cavity, and was suspended on a Bowling stand. There was no evidence of a submucosal cleft or bifid uvula. The tonsils were 1+ on the right and 2+ on the left, hypertrophic, and chronically infected-appearing. The tonsil tenaculum was used to grasp the right tonsil, and the Bovie cautery was used to dissect out the tonsil from the superior anterior pole down to the posterior inferior pole at the level of the capsule. The same procedure was then performed on the left side. Left tonsil was more indurated and had surrounding scar tissue between tonsil and surrounding musculature. Peritonsillar plane was less well defined. Hemostasis was achieved with the Bipolar cautery.     Attention was then turned to the adenoid. The red rubber catheters placed through  the right nasal passage and the soft palate was elevated anteriorly. A mirror was used to visualize the adenoid pad which was mildly enlarged, and chronically infected-appearing. The suction Bovie was used to take down the adenoid pad and achieve hemostasis within the nasopharynx. Saline was used to irrigate the nasopharynx, and hemostasis was confirmed. This concluded the case, the patient's care was handed back to anesthesia in good condition without any complications.      Beau Andres MD     Date: 9/23/2021  Time: 10:14 EDT

## 2021-09-27 ENCOUNTER — OFFICE VISIT (OUTPATIENT)
Dept: OTOLARYNGOLOGY | Facility: CLINIC | Age: 46
End: 2021-09-27

## 2021-09-27 VITALS — WEIGHT: 135.6 LBS | BODY MASS INDEX: 26.62 KG/M2 | HEIGHT: 60 IN | TEMPERATURE: 97.6 F

## 2021-09-27 DIAGNOSIS — J03.90 TONSILLITIS: Primary | ICD-10-CM

## 2021-09-27 DIAGNOSIS — R07.0 THROAT PAIN: ICD-10-CM

## 2021-09-27 PROCEDURE — 99213 OFFICE O/P EST LOW 20 MIN: CPT | Performed by: OTOLARYNGOLOGY

## 2021-09-27 NOTE — PROGRESS NOTES
Patient Name: Alejandra Delacruz   Visit Date: 2021   Patient ID: [id number]  Provider: Beau Andres MD    Sex: female  Location: [facility]   YOB: 1975  Location Address: [address] [place] [address], [address] [place] [address],?[place]?[id number]    Primary Care Provider Jesús Holden  Location Phone: [phone]    Referring Provider: No ref. provider found        Chief Complaint  No chief complaint on file.    Subjective    History of Present Illness  Alejandra Delacruz is a 46 y.o. female who presents to [facility] today as a consult from No ref. provider found.    She presents the clinic today for follow-up regarding throat pain, and recently underwent tonsillectomy and adenoidectomy as well as direct laryngoscopy.  She informs me that she has been doing very well, has not had any issues postoperatively.  She still has some mild discomfort and has 2 more pills left of her Norco.  We discussed transitioning to Tylenol.     Pathology report revealed focal acute inflammation with actinomyces colonies bilaterally but no evidence of malignancy on either side.    Intraoperatively there is significant scar tissue around the left tonsil, likely from infection since pathology was clear.    Past Medical History:   Diagnosis Date   • Arthritis    • Depression    • Dysphagia    • Enlarged tonsils    • GERD (gastroesophageal reflux disease)    • Hyperlipidemia    • Hypertension    • Sleep apnea     dose not use cpap    • Type 2 diabetes mellitus (CMS/[facility])     blood glucose around 150-200 , last A1c  7 per pt    • Vitamin D deficiency        Past Surgical History:   Procedure Laterality Date   • ADENOIDECTOMY     • BLADDER SUSPENSION     •  SECTION     • D & C AND LAPAROSCOPY     • EYE SURGERY      x4    • LASER ABLATION     • SHOULDER SURGERY      bilat SLAP tear   • TONSILLECTOMY     • TONSILLECTOMY AND ADENOIDECTOMY N/A 2021    Procedure:  TONSILLECTOMY AND ADENOIDECTOMY, DIRECT LARYNGOSCOPY,BRONCHOSCOPY,ESOPHAGOSCOPY;  Surgeon: Beau Andres MD;  Location: [facility] MAIN OR;  Service: ENT;  Laterality: N/A;         Current Outpatient Medications:   •  ALPRAZolam (XANAX) 0.5 MG tablet, Take 0.25-0.5 mg by mouth Daily As Needed., Disp: , Rfl:   •  atorvastatin (LIPITOR) 40 MG tablet, Take 40 mg by mouth Every Night., Disp: , Rfl:   •  Continuous Blood Gluc Sensor (Dexcom G6 Sensor), Apply 1 each topically to the appropriate area as directed., Disp: , Rfl:   •  Continuous Blood Gluc Transmit (Dexcom G6 Transmitter) misc, , Disp: , Rfl:   •  cyclobenzaprine (FLEXERIL) 10 MG tablet, Take 10 mg by mouth 3 (Three) Times a Day As Needed for muscle spasms., Disp: , Rfl:   •  empagliflozin (JARDIANCE) 25 MG tablet tablet, Take 25 mg by mouth Daily., Disp: , Rfl:   •  ergocalciferol (DRISDOL) 81204 UNITS capsule, Take 1 po q week (Patient taking differently: Take 50,000 Units by mouth. Takes twice a week), Disp: 12 capsule, Rfl: 1  •  fluticasone (FLONASE) 50 MCG/ACT nasal spray, 1 spray into the nostril(s) as directed by provider Daily., Disp: , Rfl:   •  gabapentin (NEURONTIN) 800 MG tablet, Take 800 mg by mouth 2 (Two) Times a Day., Disp: , Rfl:   •  glucagon (GLUCAGON EMERGENCY) 1 MG injection, Inject 1 mg under the skin 1 (One) Time As Needed for low blood sugar for up to 1 dose., Disp: 1 kit, Rfl: 0  •  HYDROcodone-acetaminophen (NORCO) 5-325 MG per tablet, Take 1-2 tablets by mouth Every 4 (Four) Hours As Needed for Moderate Pain ., Disp: 30 tablet, Rfl: 0  •  hydrOXYzine (ATARAX) 25 MG tablet, Take 25 mg by mouth 2 (two) times a day., Disp: , Rfl:   •  ibuprofen (ADVIL,MOTRIN) 800 MG tablet, Take 800 mg by mouth Every 8 (Eight) Hours As Needed for mild pain (1-3)., Disp: , Rfl:   •  lisinopril (PRINIVIL,ZESTRIL) 10 MG tablet, Take 10 mg by mouth Daily., Disp: , Rfl:   •  metFORMIN ER (GLUCOPHAGE-XR) 500 MG 24 hr tablet, Take 1,000 mg by mouth 2 (two)  "times a day., Disp: , Rfl:   •  omeprazole (priLOSEC) 40 MG capsule, Take 40 mg by mouth Daily., Disp: , Rfl:   •  rizatriptan (MAXALT) 10 MG tablet, Take 10 mg by mouth., Disp: , Rfl:   •  Semaglutide,0.25 or 0.5MG/DOS, (Ozempic, 0.25 or 0.5 MG/DOSE,) 2 MG/1.5ML solution pen-injector, INJECT 0.5MG SUBCUTANEOUSLY EVERY 7 DAYS, Disp: , Rfl:   •  traZODone (DESYREL) 100 MG tablet, Take 100 mg by mouth Every Night., Disp: , Rfl:   •  Vascepa 1 g capsule capsule, Take 2 g by mouth 2 (two) times a day., Disp: , Rfl:   •  Viibryd 40 MG tablet tablet, Take 40 mg by mouth Daily., Disp: , Rfl:   •  zolpidem (AMBIEN) 10 MG tablet, Take 10 mg by mouth At Night As Needed., Disp: , Rfl:      Allergies   Allergen Reactions   • Morphine And Related Itching       Family History   Problem Relation Age of Onset   • Diabetes Mother    • Heart disease Mother    • Heart disease Sister    • Diabetes Paternal Aunt    • Cancer Maternal Grandmother    • Heart disease Paternal Grandfather    • Cancer Paternal Grandfather         Social History     Social History Narrative   • Not on file       Objective     Vital Signs:   Temp 97.6 °F (36.4 °C) (Temporal)   Ht 152.4 cm (60\")   Wt 61.5 kg (135 lb 9.6 oz)   BMI 26.48 kg/m²       Physical Exam         Constitutional   Appearance  · : well developed, well-nourished, alert and in no acute distress, voice clear and strong    Head  Inspection  · : no deformities or lesions  Face  Inspection  · : No facial lesions; House-Brackmann I/VI bilaterally  Palpation  · : No TMJ crepitus nor  muscle tenderness bilaterally    Eyes  Vision  Visual Fields  · : Extraocular movements are intact. No spontaneous or gaze-induced nystagmus.  Conjunctivae  · : clear  Sclerae  · : clear  Pupils and Irises  · : pupils equal, round, and reactive to light.     Ears, Nose, Mouth and Throat    Ears    External Ears  · : appearance within normal limits, no lesions present  Otoscopic Examination  · : Tympanic " membrane appearance within normal limits bilaterally without perforations, well-aerated middle ears  Hearing  · : intact to conversational voice both ears  Tunning fork testing:     :    Nose    External Nose  · : appearance normal  Intranasal Exam  · : mucosa within normal limits, vestibules normal, no intranasal lesions present, septum midline, sinuses non tender to percussion  Oral Cavity    Oral Mucosa  · : oral mucosa normal without pallor or cyanosis  Lips  · : lip appearance normal  Teeth  · : normal dentition for age  Gums  · : gums pink, non-swollen, no bleeding present  Tongue  · : tongue appearance normal; normal mobility  Palate  · : hard palate normal, soft palate appearance normal with symmetric mobility    Throat    Oropharynx  · : no inflammation or lesions present, tonsils surgically absent, well-healing tonsillar fossa, granulation tissue present bilaterally  Hypopharynx  · : appearance within normal limits, superior epiglottis within normal limits  Larynx  · : appearance within normal limits, vocal cords within normal limits, no lesions present    Neck  Inspection/Palpation  · : normal appearance, no masses or tenderness, trachea midline; thyroid size normal, nontender, no nodules or masses present on palpation    Respiratory  Respiratory Effort  · : breathing unlabored  Inspection of Chest  · : normal appearance, no retractions    Cardiovascular  Heart  · : regular rate and rhythm    Lymphatic  Neck  · : no lymphadenopathy present  Supraclavicular Nodes  · : no lymphadenopathy present  Preauricular Nodes  · : no lymphadenopathy present    Skin and Subcutaneous Tissue  General Inspection  · : Regarding face and neck - there are no rashes present, no lesions present, and no areas of discoloration    Neurologic  Cranial Nerves  · : cranial nerves II-XII are grossly intact bilaterally  Gait and Station  · : normal gait, able to stand without diffculty    Psychiatric  Judgement and Insight  · :  judgment and insight intact  Mood and Affect  · : mood normal, affect appropriate          Assessment and Plan    Diagnoses and all orders for this visit:    1. Tonsillitis (Primary)    2. Throat pain    Exam reveals well-healing tonsillar fossa and pathology was negative for malignancy.  I discussed physical restrictions for another week and would like to see her back in 6 weeks to reassess her throat, or sooner should there be any issues.    Follow Up   No follow-ups on file.  Patient was given instructions and counseling regarding her condition or for health maintenance advice. Please see specific information pulled into the AVS if appropriate.

## 2021-11-09 ENCOUNTER — OFFICE VISIT (OUTPATIENT)
Dept: OTOLARYNGOLOGY | Facility: CLINIC | Age: 46
End: 2021-11-09

## 2021-11-09 VITALS — BODY MASS INDEX: 27.56 KG/M2 | WEIGHT: 140.4 LBS | HEIGHT: 60 IN | TEMPERATURE: 97.8 F

## 2021-11-09 DIAGNOSIS — J03.90 TONSILLITIS: Primary | ICD-10-CM

## 2021-11-09 DIAGNOSIS — J35.8 TONSILLAR MASS: ICD-10-CM

## 2021-11-09 PROBLEM — J35.3 ENLARGED TONSILS AND ADENOIDS: Status: RESOLVED | Noted: 2021-09-16 | Resolved: 2021-11-09

## 2021-11-09 PROBLEM — R07.0 THROAT PAIN: Status: RESOLVED | Noted: 2021-09-16 | Resolved: 2021-11-09

## 2021-11-09 PROCEDURE — 99024 POSTOP FOLLOW-UP VISIT: CPT | Performed by: OTOLARYNGOLOGY

## 2021-11-09 RX ORDER — GABAPENTIN 800 MG/1
800 TABLET ORAL 3 TIMES DAILY
COMMUNITY
Start: 2021-10-25 | End: 2022-10-25

## 2021-11-09 RX ORDER — DICLOFENAC SODIUM 75 MG/1
75 TABLET, DELAYED RELEASE ORAL
COMMUNITY
Start: 2021-11-04 | End: 2022-11-04

## 2021-11-09 RX ORDER — HYDROXYZINE HYDROCHLORIDE 25 MG/1
25 TABLET, FILM COATED ORAL
COMMUNITY
Start: 2021-10-25

## 2021-11-09 RX ORDER — ONDANSETRON HYDROCHLORIDE 8 MG/1
TABLET, FILM COATED ORAL
COMMUNITY
Start: 2021-11-05

## 2021-11-09 NOTE — PROGRESS NOTES
Patient Name: Alejandar Delacruz   Visit Date: 2021   Patient ID: [id number]  Provider: Beau Andres MD    Sex: female  Location: [facility]   YOB: 1975  Location Address: [address] [place] [address], [address] [place] [address],?[place]?[id number]    Primary Care Provider Jesús Holden  Location Phone: [phone]    Referring Provider: No ref. provider found        Chief Complaint  Follow-up (6 week follow up) and Sore Throat    Subjective    History of Present Illness  Alejandra Delacruz is a 46 y.o. female who presents to [facility] today as a consult from No ref. provider found.    She presents the clinic today for evaluation of Subjective:  Throat pain and history of chronic tonsillitis and associated discomfort.  She underwent tonsillectomy and adenoidectomy for this, and has recovered very well.  She notes that the pain is completely subsided and denies any complaints whatsoever.  She had an uneventful recovery.    Objective:  Oral cavity shows tonsillar fossa are well-healed with no evidence of inflammation.    Pathology report reveals focal acute inflammation and actinomyces colonies bilaterally, negative for malignancy.    Assessment:  Chronic tonsillitis, resolved    Plan:  I will have her contact me should there be any further issues.    Beau Andres MD  2021   10:53 EST .    Past Medical History:   Diagnosis Date   • Arthritis    • Depression    • Dysphagia    • Enlarged tonsils    • GERD (gastroesophageal reflux disease)    • Hyperlipidemia    • Hypertension    • Sleep apnea     dose not use cpap    • Tonsillitis    • Type 2 diabetes mellitus (HCC)     blood glucose around 150-200 , last A1c  7 per pt    • Vitamin D deficiency        Past Surgical History:   Procedure Laterality Date   • ADENOIDECTOMY     • BLADDER SUSPENSION     •  SECTION     • D & C AND LAPAROSCOPY     • EYE SURGERY      x4    • LASER ABLATION      • SHOULDER SURGERY      bilat SLAP tear   • TONSILLECTOMY     • TONSILLECTOMY AND ADENOIDECTOMY N/A 9/20/2021    Procedure: TONSILLECTOMY AND ADENOIDECTOMY, DIRECT LARYNGOSCOPY,BRONCHOSCOPY,ESOPHAGOSCOPY;  Surgeon: Beau Andres MD;  Location: [facility] MAIN OR;  Service: ENT;  Laterality: N/A;         Current Outpatient Medications:   •  atorvastatin (LIPITOR) 40 MG tablet, Take 40 mg by mouth Every Night., Disp: , Rfl:   •  Continuous Blood Gluc Sensor (Dexcom G6 Sensor), Apply 1 each topically to the appropriate area as directed., Disp: , Rfl:   •  Continuous Blood Gluc Transmit (Dexcom G6 Transmitter) misc, , Disp: , Rfl:   •  cyclobenzaprine (FLEXERIL) 10 MG tablet, Take 10 mg by mouth 3 (Three) Times a Day As Needed for muscle spasms., Disp: , Rfl:   •  diclofenac (VOLTAREN) 75 MG EC tablet, Take 75 mg by mouth., Disp: , Rfl:   •  empagliflozin (JARDIANCE) 25 MG tablet tablet, Take 25 mg by mouth Daily., Disp: , Rfl:   •  ergocalciferol (DRISDOL) 32146 UNITS capsule, Take 1 po q week (Patient taking differently: Take 50,000 Units by mouth. Takes twice a week), Disp: 12 capsule, Rfl: 1  •  gabapentin (NEURONTIN) 800 MG tablet, Take 800 mg by mouth 3 (Three) Times a Day., Disp: , Rfl:   •  glucagon (GLUCAGON EMERGENCY) 1 MG injection, Inject 1 mg under the skin 1 (One) Time As Needed for low blood sugar for up to 1 dose., Disp: 1 kit, Rfl: 0  •  hydrOXYzine (ATARAX) 25 MG tablet, Take 25 mg by mouth., Disp: , Rfl:   •  ibuprofen (ADVIL,MOTRIN) 800 MG tablet, Take 800 mg by mouth Every 8 (Eight) Hours As Needed for mild pain (1-3)., Disp: , Rfl:   •  lisinopril (PRINIVIL,ZESTRIL) 10 MG tablet, Take 10 mg by mouth Daily., Disp: , Rfl:   •  metFORMIN ER (GLUCOPHAGE-XR) 500 MG 24 hr tablet, Take 1,000 mg by mouth 2 (two) times a day., Disp: , Rfl:   •  omeprazole (priLOSEC) 40 MG capsule, Take 40 mg by mouth Daily., Disp: , Rfl:   •  ondansetron (ZOFRAN) 8 MG tablet, , Disp: , Rfl:   •  rizatriptan (MAXALT) 10  "MG tablet, Take 10 mg by mouth., Disp: , Rfl:   •  Semaglutide,0.25 or 0.5MG/DOS, (Ozempic, 0.25 or 0.5 MG/DOSE,) 2 MG/1.5ML solution pen-injector, INJECT 0.5MG SUBCUTANEOUSLY EVERY 7 DAYS, Disp: , Rfl:   •  traZODone (DESYREL) 100 MG tablet, Take 100 mg by mouth Every Night., Disp: , Rfl:   •  Vascepa 1 g capsule capsule, Take 2 g by mouth 2 (two) times a day., Disp: , Rfl:   •  Viibryd 40 MG tablet tablet, Take 40 mg by mouth Daily., Disp: , Rfl:   •  zolpidem (AMBIEN) 10 MG tablet, Take 10 mg by mouth At Night As Needed., Disp: , Rfl:      Allergies   Allergen Reactions   • Morphine And Related Itching       Family History   Problem Relation Age of Onset   • Diabetes Mother    • Heart disease Mother    • Heart disease Sister    • Diabetes Paternal Aunt    • Cancer Maternal Grandmother    • Heart disease Paternal Grandfather    • Cancer Paternal Grandfather         Social History     Social History Narrative   • Not on file       Objective     Vital Signs:   Temp 97.8 °F (36.6 °C) (Temporal)   Ht 152.4 cm (60\")   Wt 63.7 kg (140 lb 6.4 oz)   BMI 27.42 kg/m²       Physical Exam         Constitutional   Appearance  · : well developed, well-nourished, alert and in no acute distress, voice clear and strong    Head  Inspection  · : no deformities or lesions  Face  Inspection  · : No facial lesions; House-Brackmann I/VI bilaterally  Palpation  · : No TMJ crepitus nor  muscle tenderness bilaterally    Eyes  Vision  Visual Fields  · : Extraocular movements are intact. No spontaneous or gaze-induced nystagmus.  Conjunctivae  · : clear  Sclerae  · : clear  Pupils and Irises  · : pupils equal, round, and reactive to light.     Ears, Nose, Mouth and Throat    Ears    External Ears  · : appearance within normal limits, no lesions present  Otoscopic Examination  · : Tympanic membrane appearance within normal limits bilaterally without perforations, well-aerated middle ears  Hearing  · : intact to conversational " voice both ears  Tunning fork testing:     :    Nose    External Nose  · : appearance normal  Intranasal Exam  · : mucosa within normal limits, vestibules normal, no intranasal lesions present, septum midline, sinuses non tender to percussion  Oral Cavity    Oral Mucosa  · : oral mucosa normal without pallor or cyanosis  Lips  · : lip appearance normal  Teeth  · : normal dentition for age  Gums  · : gums pink, non-swollen, no bleeding present  Tongue  · : tongue appearance normal; normal mobility  Palate  · : hard palate normal, soft palate appearance normal with symmetric mobility    Throat    Oropharynx  · : no inflammation or lesions present, tonsils within normal limits  Hypopharynx  · : appearance within normal limits, superior epiglottis within normal limits  Larynx  · : appearance within normal limits, vocal cords within normal limits, no lesions present    Neck  Inspection/Palpation  · : normal appearance, no masses or tenderness, trachea midline; thyroid size normal, nontender, no nodules or masses present on palpation    Respiratory  Respiratory Effort  · : breathing unlabored  Inspection of Chest  · : normal appearance, no retractions    Cardiovascular  Heart  · : regular rate and rhythm    Lymphatic  Neck  · : no lymphadenopathy present  Supraclavicular Nodes  · : no lymphadenopathy present  Preauricular Nodes  · : no lymphadenopathy present    Skin and Subcutaneous Tissue  General Inspection  · : Regarding face and neck - there are no rashes present, no lesions present, and no areas of discoloration    Neurologic  Cranial Nerves  · : cranial nerves II-XII are grossly intact bilaterally  Gait and Station  · : normal gait, able to stand without diffculty    Psychiatric  Judgement and Insight  · : judgment and insight intact  Mood and Affect  · : mood normal, affect appropriate          Assessment and Plan    Diagnoses and all orders for this visit:    1. Tonsillitis (Primary)    2. Tonsillar mass    Exam  today revealed well-healed tonsillar fossa.  She is having no symptoms.  Have asked her to contact me should there be any further issues.    Follow Up   No follow-ups on file.  Patient was given instructions and counseling regarding her condition or for health maintenance advice. Please see specific information pulled into the AVS if appropriate.

## 2022-07-28 ENCOUNTER — OFFICE (OUTPATIENT)
Dept: URBAN - METROPOLITAN AREA CLINIC 94 | Facility: CLINIC | Age: 47
End: 2022-07-28

## 2022-07-28 VITALS
WEIGHT: 155 LBS | OXYGEN SATURATION: 95 % | HEIGHT: 60 IN | HEART RATE: 88 BPM | DIASTOLIC BLOOD PRESSURE: 76 MMHG | SYSTOLIC BLOOD PRESSURE: 128 MMHG

## 2022-07-28 DIAGNOSIS — E11.9 TYPE 2 DIABETES MELLITUS WITHOUT COMPLICATIONS: ICD-10-CM

## 2022-07-28 DIAGNOSIS — K21.9 GASTRO-ESOPHAGEAL REFLUX DISEASE WITHOUT ESOPHAGITIS: ICD-10-CM

## 2022-07-28 DIAGNOSIS — K52.9 NONINFECTIVE GASTROENTERITIS AND COLITIS, UNSPECIFIED: ICD-10-CM

## 2022-07-28 DIAGNOSIS — R14.0 ABDOMINAL DISTENSION (GASEOUS): ICD-10-CM

## 2022-07-28 PROCEDURE — 99204 OFFICE O/P NEW MOD 45 MIN: CPT | Performed by: INTERNAL MEDICINE

## 2022-09-12 VITALS
DIASTOLIC BLOOD PRESSURE: 84 MMHG | DIASTOLIC BLOOD PRESSURE: 72 MMHG | HEIGHT: 60 IN | HEART RATE: 96 BPM | OXYGEN SATURATION: 97 % | HEART RATE: 90 BPM | RESPIRATION RATE: 23 BRPM | RESPIRATION RATE: 22 BRPM | TEMPERATURE: 97.6 F | DIASTOLIC BLOOD PRESSURE: 53 MMHG | RESPIRATION RATE: 17 BRPM | SYSTOLIC BLOOD PRESSURE: 128 MMHG | SYSTOLIC BLOOD PRESSURE: 88 MMHG | RESPIRATION RATE: 16 BRPM | HEART RATE: 87 BPM | DIASTOLIC BLOOD PRESSURE: 83 MMHG | SYSTOLIC BLOOD PRESSURE: 130 MMHG | TEMPERATURE: 97.2 F | HEART RATE: 89 BPM | DIASTOLIC BLOOD PRESSURE: 64 MMHG | SYSTOLIC BLOOD PRESSURE: 143 MMHG | DIASTOLIC BLOOD PRESSURE: 52 MMHG | DIASTOLIC BLOOD PRESSURE: 45 MMHG | RESPIRATION RATE: 24 BRPM | OXYGEN SATURATION: 98 % | SYSTOLIC BLOOD PRESSURE: 93 MMHG | OXYGEN SATURATION: 100 % | SYSTOLIC BLOOD PRESSURE: 91 MMHG | DIASTOLIC BLOOD PRESSURE: 47 MMHG | HEART RATE: 86 BPM | WEIGHT: 148 LBS | HEART RATE: 85 BPM | OXYGEN SATURATION: 96 % | DIASTOLIC BLOOD PRESSURE: 44 MMHG | RESPIRATION RATE: 20 BRPM | SYSTOLIC BLOOD PRESSURE: 104 MMHG | SYSTOLIC BLOOD PRESSURE: 87 MMHG | RESPIRATION RATE: 26 BRPM | HEART RATE: 91 BPM | HEART RATE: 82 BPM

## 2022-09-15 ENCOUNTER — OFFICE (OUTPATIENT)
Dept: URBAN - METROPOLITAN AREA PATHOLOGY 4 | Facility: PATHOLOGY | Age: 47
End: 2022-09-15
Payer: COMMERCIAL

## 2022-09-15 ENCOUNTER — AMBULATORY SURGICAL CENTER (OUTPATIENT)
Dept: URBAN - METROPOLITAN AREA SURGERY 17 | Facility: SURGERY | Age: 47
End: 2022-09-15

## 2022-09-15 DIAGNOSIS — K21.9 GASTRO-ESOPHAGEAL REFLUX DISEASE WITHOUT ESOPHAGITIS: ICD-10-CM

## 2022-09-15 DIAGNOSIS — K52.9 NONINFECTIVE GASTROENTERITIS AND COLITIS, UNSPECIFIED: ICD-10-CM

## 2022-09-15 DIAGNOSIS — K31.89 OTHER DISEASES OF STOMACH AND DUODENUM: ICD-10-CM

## 2022-09-15 DIAGNOSIS — R14.0 ABDOMINAL DISTENSION (GASEOUS): ICD-10-CM

## 2022-09-15 PROBLEM — Z12.11 SCREENING FOR COLONIC NEOPLASIA: Status: ACTIVE | Noted: 2022-09-15

## 2022-09-15 LAB
GI HISTOLOGY: A. UNSPECIFIED: (no result)
GI HISTOLOGY: B. UNSPECIFIED: (no result)
GI HISTOLOGY: C. SELECT: (no result)
GI HISTOLOGY: D. SELECT: (no result)
GI HISTOLOGY: E. SELECT: (no result)
GI HISTOLOGY: PDF REPORT: (no result)

## 2022-09-15 PROCEDURE — 88305 TISSUE EXAM BY PATHOLOGIST: CPT | Performed by: INTERNAL MEDICINE

## 2022-09-15 PROCEDURE — 43239 EGD BIOPSY SINGLE/MULTIPLE: CPT | Performed by: INTERNAL MEDICINE

## 2022-09-15 PROCEDURE — 45380 COLONOSCOPY AND BIOPSY: CPT | Performed by: INTERNAL MEDICINE

## 2022-09-15 PROCEDURE — 88342 IMHCHEM/IMCYTCHM 1ST ANTB: CPT | Performed by: INTERNAL MEDICINE

## 2022-12-01 ENCOUNTER — OFFICE (OUTPATIENT)
Dept: URBAN - METROPOLITAN AREA CLINIC 94 | Facility: CLINIC | Age: 47
End: 2022-12-01
Payer: COMMERCIAL

## 2022-12-01 VITALS — DIASTOLIC BLOOD PRESSURE: 84 MMHG | SYSTOLIC BLOOD PRESSURE: 120 MMHG | HEART RATE: 88 BPM | HEIGHT: 60 IN

## 2022-12-01 DIAGNOSIS — E74.31 SUCRASE-ISOMALTASE DEFICIENCY: ICD-10-CM

## 2022-12-01 PROCEDURE — 99213 OFFICE O/P EST LOW 20 MIN: CPT | Performed by: INTERNAL MEDICINE

## 2023-10-02 ENCOUNTER — TELEHEALTH PROVIDED IN PATIENT'S HOME (OUTPATIENT)
Dept: URBAN - METROPOLITAN AREA TELEHEALTH 6 | Facility: TELEHEALTH | Age: 48
End: 2023-10-02

## 2023-10-02 VITALS — HEIGHT: 60 IN

## 2023-10-02 DIAGNOSIS — K21.9 GASTRO-ESOPHAGEAL REFLUX DISEASE WITHOUT ESOPHAGITIS: ICD-10-CM

## 2023-10-02 DIAGNOSIS — E74.31 SUCRASE-ISOMALTASE DEFICIENCY: ICD-10-CM

## 2023-10-02 DIAGNOSIS — R19.7 DIARRHEA, UNSPECIFIED: ICD-10-CM

## 2023-10-02 PROCEDURE — 99213 OFFICE O/P EST LOW 20 MIN: CPT | Mod: 95 | Performed by: INTERNAL MEDICINE

## 2024-03-20 ENCOUNTER — OFFICE (OUTPATIENT)
Dept: URBAN - METROPOLITAN AREA CLINIC 76 | Facility: CLINIC | Age: 49
End: 2024-03-20

## 2024-03-20 VITALS — HEIGHT: 60 IN | WEIGHT: 140 LBS

## 2024-03-20 DIAGNOSIS — K21.9 GASTRO-ESOPHAGEAL REFLUX DISEASE WITHOUT ESOPHAGITIS: ICD-10-CM

## 2024-03-20 DIAGNOSIS — R14.0 ABDOMINAL DISTENSION (GASEOUS): ICD-10-CM

## 2024-03-20 DIAGNOSIS — E74.31 SUCRASE-ISOMALTASE DEFICIENCY: ICD-10-CM

## 2024-03-20 PROCEDURE — 99214 OFFICE O/P EST MOD 30 MIN: CPT | Performed by: INTERNAL MEDICINE

## 2024-03-20 RX ORDER — ESOMEPRAZOLE MAGNESIUM 40 MG/1
40 CAPSULE, DELAYED RELEASE ORAL
Qty: 180 | Refills: 3 | Status: ACTIVE

## 2024-03-20 RX ORDER — SACROSIDASE 8500 [IU]/ML
SOLUTION ORAL
Qty: 8500 | Refills: 6 | Status: ACTIVE
Start: 2022-08-22

## 2025-03-27 ENCOUNTER — OFFICE (OUTPATIENT)
Dept: URBAN - METROPOLITAN AREA CLINIC 5 | Facility: CLINIC | Age: 50
End: 2025-03-27

## 2025-03-27 VITALS
SYSTOLIC BLOOD PRESSURE: 115 MMHG | HEART RATE: 95 BPM | DIASTOLIC BLOOD PRESSURE: 67 MMHG | HEIGHT: 60 IN | WEIGHT: 142 LBS

## 2025-03-27 DIAGNOSIS — K21.9 GASTRO-ESOPHAGEAL REFLUX DISEASE WITHOUT ESOPHAGITIS: ICD-10-CM

## 2025-03-27 DIAGNOSIS — E74.31 SUCRASE-ISOMALTASE DEFICIENCY: ICD-10-CM

## 2025-03-27 DIAGNOSIS — E11.9 TYPE 2 DIABETES MELLITUS WITHOUT COMPLICATIONS: ICD-10-CM

## 2025-03-27 DIAGNOSIS — R14.0 ABDOMINAL DISTENSION (GASEOUS): ICD-10-CM

## 2025-03-27 PROCEDURE — 99214 OFFICE O/P EST MOD 30 MIN: CPT | Performed by: INTERNAL MEDICINE

## 2025-03-27 RX ORDER — ESOMEPRAZOLE MAGNESIUM 40 MG/1
CAPSULE, DELAYED RELEASE PELLETS ORAL
Qty: 180 | Refills: 3 | Status: ACTIVE

## 2025-05-05 ENCOUNTER — ON CAMPUS - OUTPATIENT (OUTPATIENT)
Dept: URBAN - METROPOLITAN AREA HOSPITAL 108 | Facility: HOSPITAL | Age: 50
End: 2025-05-05
Payer: COMMERCIAL

## 2025-05-05 DIAGNOSIS — K29.50 UNSPECIFIED CHRONIC GASTRITIS WITHOUT BLEEDING: ICD-10-CM

## 2025-05-05 DIAGNOSIS — D50.9 IRON DEFICIENCY ANEMIA, UNSPECIFIED: ICD-10-CM

## 2025-05-05 PROCEDURE — 43239 EGD BIOPSY SINGLE/MULTIPLE: CPT | Performed by: INTERNAL MEDICINE

## 2025-05-05 PROCEDURE — 45378 DIAGNOSTIC COLONOSCOPY: CPT | Performed by: INTERNAL MEDICINE

## (undated) DEVICE — T AND A PACK: Brand: MEDLINE INDUSTRIES, INC.

## (undated) DEVICE — ELECTRD BLD EDGE/INSUL1P 2.4X5.1MM STRL